# Patient Record
(demographics unavailable — no encounter records)

---

## 2025-03-11 NOTE — DISCUSSION/SUMMARY
Medication/Dose: fluticasone- butabital  Patient last seen by PCP: 7-13-20  Next office visit with PCP: none  Last Lab:8-27-19    Above information requires contacting patient: Yes    PDMP needs to be reviewed by Provider    Sent to provider to approve or deny [FreeTextEntry1] : 1.  Atypical chest pain: EKG reviewed normal sinus rhythm nonspecific ST-T wave abnormalities.  Will need to rule out ischemic heart disease in this middle-age male with heparin hyperlipidemia atypical chest discomfort.  Will advise with results of an exercise nuclear stress test is known. 2.  Shortness of breath: Echocardiogram 3.  Hypertension: Well-controlled on current medication advised to continue 4.  Hyperlipidemia: Continue to follow-up with PCP for routine medical matters blood work and cholesterol management 5.  Carotid bruit: Carotid duplex [EKG obtained to assist in diagnosis and management of assessed problem(s)] : EKG obtained to assist in diagnosis and management of assessed problem(s)

## 2025-03-11 NOTE — HISTORY OF PRESENT ILLNESS
[FreeTextEntry1] : 62-year-old male with past medical history of hypertension hyperlipidemia hypothyroidism comes in accompanied by his wife for cardiac evaluation.  Overall, leads a sedentary lifestyle has occasional sharp nonradiating left upper chest pain at rest and describes shortness of breath on exertional activity mainly stairs.  However denies any PND orthopnea palpitations or exertional chest pain.

## 2025-03-11 NOTE — PHYSICAL EXAM
[Well Developed] : well developed [No Acute Distress] : no acute distress [Normal Conjunctiva] : normal conjunctiva [Normal Venous Pressure] : normal venous pressure [Carotid Bruit] : carotid bruit [Normal S1, S2] : normal S1, S2 [No Rub] : no rub [No Gallop] : no gallop [Murmur] : murmur [Clear Lung Fields] : clear lung fields [Good Air Entry] : good air entry [No Respiratory Distress] : no respiratory distress  [Soft] : abdomen soft [Non Tender] : non-tender [No Masses/organomegaly] : no masses/organomegaly [Normal Bowel Sounds] : normal bowel sounds [Normal Gait] : normal gait [No Edema] : no edema [No Cyanosis] : no cyanosis [No Clubbing] : no clubbing [No Varicosities] : no varicosities [No Rash] : no rash [No Skin Lesions] : no skin lesions [Moves all extremities] : moves all extremities [No Focal Deficits] : no focal deficits [Normal Speech] : normal speech [Alert and Oriented] : alert and oriented [Normal memory] : normal memory [de-identified] : overweight [de-identified] : andrea